# Patient Record
Sex: FEMALE | Race: WHITE | URBAN - METROPOLITAN AREA
[De-identification: names, ages, dates, MRNs, and addresses within clinical notes are randomized per-mention and may not be internally consistent; named-entity substitution may affect disease eponyms.]

---

## 2019-04-28 ENCOUNTER — HOSPITAL ENCOUNTER (OUTPATIENT)
Age: 34
Setting detail: OBSERVATION
Discharge: ANOTHER HOSPITAL | End: 2019-04-28
Attending: OBSTETRICS & GYNECOLOGY | Admitting: OBSTETRICS & GYNECOLOGY
Payer: COMMERCIAL

## 2019-04-28 VITALS
DIASTOLIC BLOOD PRESSURE: 68 MMHG | TEMPERATURE: 98.2 F | HEART RATE: 94 BPM | RESPIRATION RATE: 16 BRPM | HEIGHT: 64 IN | BODY MASS INDEX: 26.98 KG/M2 | WEIGHT: 158 LBS | SYSTOLIC BLOOD PRESSURE: 120 MMHG

## 2019-04-28 PROBLEM — O44.03 COMPLETE PLACENTA PREVIA NOS OR WITHOUT HEMORRHAGE, THIRD TRIMESTER: Status: ACTIVE | Noted: 2019-04-28

## 2019-04-28 PROBLEM — O36.8390 VARIABLE FETAL HEART RATE DECELERATIONS, ANTEPARTUM: Status: ACTIVE | Noted: 2019-04-28

## 2019-04-28 PROBLEM — Z3A.34 PREGNANCY WITH 34 COMPLETED WEEKS GESTATION: Status: ACTIVE | Noted: 2019-04-28

## 2019-04-28 PROBLEM — O44.13 COMPLETE PLACENTA PREVIA WITH HEMORRHAGE, THIRD TRIMESTER: Status: ACTIVE | Noted: 2019-04-28

## 2019-04-28 LAB
ABO/RH: NORMAL
ANTIBODY SCREEN: NORMAL
APTT: 27.3 SEC (ref 24.5–35.1)
D DIMER: 207 NG/ML DDU
HCT VFR BLD CALC: 37.9 % (ref 34–48)
HEMOGLOBIN: 12.6 G/DL (ref 11.5–15.5)
INR BLD: 1.1
MCH RBC QN AUTO: 30.8 PG (ref 26–35)
MCHC RBC AUTO-ENTMCNC: 33.2 % (ref 32–34.5)
MCV RBC AUTO: 92.7 FL (ref 80–99.9)
PDW BLD-RTO: 14 FL (ref 11.5–15)
PLATELET # BLD: 200 E9/L (ref 130–450)
PMV BLD AUTO: 11 FL (ref 7–12)
PROTHROMBIN TIME: 13 SEC (ref 9.3–12.4)
RBC # BLD: 4.09 E12/L (ref 3.5–5.5)
WBC # BLD: 10.3 E9/L (ref 4.5–11.5)

## 2019-04-28 PROCEDURE — 6360000002 HC RX W HCPCS: Performed by: OBSTETRICS & GYNECOLOGY

## 2019-04-28 PROCEDURE — G0378 HOSPITAL OBSERVATION PER HR: HCPCS

## 2019-04-28 PROCEDURE — 86900 BLOOD TYPING SEROLOGIC ABO: CPT

## 2019-04-28 PROCEDURE — 96372 THER/PROPH/DIAG INJ SC/IM: CPT

## 2019-04-28 PROCEDURE — 76805 OB US >/= 14 WKS SNGL FETUS: CPT

## 2019-04-28 PROCEDURE — 76819 FETAL BIOPHYS PROFIL W/O NST: CPT

## 2019-04-28 PROCEDURE — 76820 UMBILICAL ARTERY ECHO: CPT | Performed by: OBSTETRICS & GYNECOLOGY

## 2019-04-28 PROCEDURE — 36415 COLL VENOUS BLD VENIPUNCTURE: CPT

## 2019-04-28 PROCEDURE — 85610 PROTHROMBIN TIME: CPT

## 2019-04-28 PROCEDURE — 76821 MIDDLE CEREBRAL ARTERY ECHO: CPT | Performed by: OBSTETRICS & GYNECOLOGY

## 2019-04-28 PROCEDURE — 86850 RBC ANTIBODY SCREEN: CPT

## 2019-04-28 PROCEDURE — 85027 COMPLETE CBC AUTOMATED: CPT

## 2019-04-28 PROCEDURE — 76820 UMBILICAL ARTERY ECHO: CPT

## 2019-04-28 PROCEDURE — 85378 FIBRIN DEGRADE SEMIQUANT: CPT

## 2019-04-28 PROCEDURE — 99243 OFF/OP CNSLTJ NEW/EST LOW 30: CPT | Performed by: OBSTETRICS & GYNECOLOGY

## 2019-04-28 PROCEDURE — 99214 OFFICE O/P EST MOD 30 MIN: CPT | Performed by: OBSTETRICS & GYNECOLOGY

## 2019-04-28 PROCEDURE — 86901 BLOOD TYPING SEROLOGIC RH(D): CPT

## 2019-04-28 PROCEDURE — 76819 FETAL BIOPHYS PROFIL W/O NST: CPT | Performed by: OBSTETRICS & GYNECOLOGY

## 2019-04-28 PROCEDURE — G0379 DIRECT REFER HOSPITAL OBSERV: HCPCS

## 2019-04-28 PROCEDURE — 76821 MIDDLE CEREBRAL ARTERY ECHO: CPT

## 2019-04-28 PROCEDURE — 76805 OB US >/= 14 WKS SNGL FETUS: CPT | Performed by: OBSTETRICS & GYNECOLOGY

## 2019-04-28 PROCEDURE — 85730 THROMBOPLASTIN TIME PARTIAL: CPT

## 2019-04-28 RX ORDER — BETAMETHASONE SODIUM PHOSPHATE AND BETAMETHASONE ACETATE 3; 3 MG/ML; MG/ML
12 INJECTION, SUSPENSION INTRA-ARTICULAR; INTRALESIONAL; INTRAMUSCULAR; SOFT TISSUE ONCE
Status: COMPLETED | OUTPATIENT
Start: 2019-04-28 | End: 2019-04-28

## 2019-04-28 RX ORDER — BETAMETHASONE SODIUM PHOSPHATE AND BETAMETHASONE ACETATE 3; 3 MG/ML; MG/ML
12 INJECTION, SUSPENSION INTRA-ARTICULAR; INTRALESIONAL; INTRAMUSCULAR; SOFT TISSUE DAILY
Status: DISCONTINUED | OUTPATIENT
Start: 2019-04-28 | End: 2019-04-28 | Stop reason: HOSPADM

## 2019-04-28 RX ADMIN — BETAMETHASONE SODIUM PHOSPHATE AND BETAMETHASONE ACETATE 12 MG: 3; 3 INJECTION, SUSPENSION INTRA-ARTICULAR; INTRALESIONAL; INTRAMUSCULAR at 08:16

## 2019-04-28 NOTE — PROGRESS NOTES
Report given to Diandra Reyes, [facility] LAURY HOOVER triage nurse @ [facility].

## 2019-04-28 NOTE — CONSULTS
RE: SELENE SCHAEFFER  : 1985   AGE: 28 y.o. This report has been created using voice recognition software. It may contain errors which are inherent in voice recognition technology. Dear Dr. Deana Barahona:    I saw your patient Alexandru Baker today for the following indications:    Patient Active Problem List   Diagnosis    Complete placenta previa with hemorrhage, third trimester    34 weeks gestation of pregnancy    History of heavy vaginal bleeding    Variable fetal heart rate decelerations, antepartum     As you know, your patient is a 28 y.o. female, who is G1(0). She has an Estimated Date of Delivery: 19 based on her LMP and previous ultrasound assessment. She is currently 34 weeks 1 days gestation based on that assessment. The patient was admitted to the labor and delivery unit for evaluation and management secondary to vaginal bleeding from a placenta previa. She has prenatal care at [facility] in [place], [place]. This is her first bleeding episode. She has not received Celestone until this admission. She had no history of coagulopathy. She takes no anticoagulative medications. She had no history of abdominal trauma. The fetal heart rate tracing shows moderate variability with accelerations. An occasional variable deceleration was noted. She was having irregular uterine contractions. A fetal ultrasound evaluation was performed. A living leggett intrauterine fetus was identified in the cephalic presentation, with normal fetal heart motion and normal fetal motion noted. The inferior margin of the posterior placenta was covering the internal cervical os. The estimated fetal weight was at the [address] growth percentile. The amniotic fluid index was slightly increased at 25.7 cm. No apparent gross fetal anatomic abnormalities were identified near is visualized. The biophysical profile was scored 10 of 10. The cord Doppler SD ratios were within normal limits. Was no evidence of absence or reversal of end-diastolic flow. The MCA Doppler peak systolic velocity (PSV) was 58.48cm/sec. The median PSV at her current gestational age is 48.47 cm/sec. The observed value is 1.19 MoM of the median value. This is unlikely to be associated with fetal anemia. The MCA SD ratio was greater than 6 which indicates no evidence of centralization of fetal blood flow. OB History    Para Term  AB Living   1 0 0 0 0 0   SAB TAB Ectopic Molar Multiple Live Births   0 0 0   0        # Outcome Date GA Lbr Steve/2nd Weight Sex Delivery Anes PTL Lv   1 Current              PAST GYNECOLOGICAL  HISTORY:  Positive for abnormal pap smears. HPV  Positive for sexually transmitted diseases. HPV  Positive for cervical LEEP   Negative for uterine surgery. Negative for ovarian or tubal surgery. Past Medical History:   Diagnosis Date    Complete placenta previa nos or without hemorrhage, first trimester        Past Surgical History:   Procedure Laterality Date    WISDOM TOOTH EXTRACTION       No Known Allergies      Current Facility-Administered Medications:     betamethasone acetate-betamethasone sodium phosphate (CELESTONE) injection 12 mg, 12 mg, Intramuscular, Daily, Mari Humphries MD    Social History     Tobacco Use    Smoking status: Never Smoker    Smokeless tobacco: Never Used   Substance Use Topics    Alcohol use: Yes     Comment: occas     FAMILY MEDICAL HISTORY:   Negative for congenital abnormalities, autism, genetic disease and mental retardation, not listed above.      Review of Systems :   CONSTITUTIONAL : No fever, no chills   HEENT : No headache, no visual changes, no rhinorrhea, no sore throat   CARDIOVASCULAR : No pain, no palpitations, no edema   RESPIRATORY : No pain, no shortness of breath   GASTROINTESTINAL : No N/V, no D/C, no abdominal pain   GENITOURINARY : Vaginal bleeding from placenta previa  MUSCULOSKELETAL : No myalgia, No back pain  NEUROLOGICAL : No numbness, no tingling, no tremors. No history of seizures  ALL OTHER SYSTEMS WERE REPORTED AS NEGATIVE. PERTINENT PHYSICAL EXAMINATION:   Patient Vitals for the past 24 hrs:   BP Temp Temp src Pulse Resp Height Weight   04/28/19 0745 123/77 98.2 °F (36.8 °C) Oral 92 18 5' 4\" (1.626 m) 158 lb (71.7 kg)     GENERAL:   The patient is a well developed, female who is alert cooperative and oriented times three in no acute distress. HEENT:  Normo cephalic and atraumatic. No facial edema. ABDOMEN:   Her uterus is gravid. The uterus was soft and nontender. She had no complaint of abdominal pain or tenderness. The fetal heart rate was 144 bpm. The fetus was in the cephalic presentation which was confirmed by the ultrasound assessment. EXTREMITIES:  No peripheral edema is noted. PELVIC EXAMINATION:  Not repeated. The examination by Dr. Meka Martinez done at the time of her admission demonstrated a small amount of dark blood in the vaginal vault. A fetal ultrasound assessment was performed today. A report is enclosed for your review. Admission on 04/28/2019   Component Date Value Ref Range Status    WBC 04/28/2019 10.3  4.5 - 11.5 E9/L Final    RBC 04/28/2019 4.09  3.50 - 5.50 E12/L Final    Hemoglobin 04/28/2019 12.6  11.5 - 15.5 g/dL Final    Hematocrit 04/28/2019 37.9  34.0 - 48.0 % Final    MCV 04/28/2019 92.7  80.0 - 99.9 fL Final    MCH 04/28/2019 30.8  26.0 - 35.0 pg Final    MCHC 04/28/2019 33.2  32.0 - 34.5 % Final    RDW 04/28/2019 14.0  11.5 - 15.0 fL Final    Platelets 21/14/7560 200  130 - 450 E9/L Final    MPV 04/28/2019 11.0  7.0 - 12.0 fL Final     IMPRESSION:  1.  IUP at 34 weeks 1 days gestation based on her Estimated Date of Delivery: 6/8/19  2. Complete previa   3. Vaginal bleeding  4. Uterine contractions   5.   Reassuring BPP and cord Doppler testing    PLAN:  Laboratory testing was obtained including a CMP, CBC, fibrinogen, aPTT, PTT/INR, d-dimer, and urine drug screen. Continuous fetal heart rate and uterine contraction monitoring should be utilized for now. Celestone is being administered for acceleration of fetal organ maturity. DVT prophylaxis should be utilized throughout her admission. The patient is requesting transferred to [facility].  If she is clinically stable and is not having active vaginal bleeding, she may be transferred at her request.  The transfer will need to be accepted by the on-call physician [facility].    Further evaluation and management will be dependent on results the patient's testing and her clinical presentation. If you have any questions regarding her management, please contact me at your convenience and thank you for allowing me to participate in her care.     Sincerely,      Bang Figueroa MD, MS, Tim Guidry, GERMAINE, RDMS, RVT  Director [address] [place] [address]  [phone]

## 2019-04-28 NOTE — PROGRESS NOTES
[facility] ambulance called and stated they went to [address] and will be here at [place] [address] in approximately 20 minutes.

## 2019-04-28 NOTE — H&P
Department of Obstetrics and Gynecology  Labor and Delivery  Triage Note      SUBJECTIVE:  Sammie Olivares is a 28 y.o. female, , No LMP recorded. Patient is pregnant., Estimated Date of Delivery: 19, 34w1d, here with the complaint of vaginal bleeding the patient reports that she is known placenta previa she is having prenatal care in [place] where they plan to deliver her  by  on May 16 she reports that the bleeding was significant , she denies any abdominal pain except for light abdominal crampin,  has not had any previous bleeding episode     Prenatal course: Known placenta previa        Review of Systems:   Ears, nose, mouth, throat, and face: negative  Respiratory: negative  Cardiovascular: negative  Gastrointestinal: negative  Genitourinary:Vaginal bleeding  Integument/breast: negative  Hematologic/lymphatic: negative  Musculoskeletal:negative  Neurological: negative  Behavioral/Psych: negative  Endocrine: negative  Allergic/Immunologic: negative    OBJECTIVE    Vitals:  /77   Pulse 92   Temp 98.2 °F (36.8 °C) (Oral)   Resp 18   Ht 5' 4\" (1.626 m)   Wt 158 lb (71.7 kg)   BMI 27.12 kg/m²         Abdomen: Gravid, soft, nontender    SSE; small amount of dark blood. No active bleeding      Vaginal Exam: Deferred,     Fetal heart rate:         Baseline Heart Rate:  140        Accelerations:  present       Decelerations:  absent       Variability:  moderate    Contraction frequency: Occasional         ASSESSMENT:      34 weeks 1 day with vaginal bleeding. The patient to patient  is  out of town no active bleeding noted at this time. Fetal heart rate is reassuring.     Plan: Admit, CBC, type and screen steroid injection, MFM consultation

## 2019-04-28 NOTE — PROGRESS NOTES
Patient wishes to be transferred to [facility]. Ok to transfer as per Dr Ketty Oquendo in process.  Report given to Dr. Goldie Baumgarten  from [facility]

## 2019-04-28 NOTE — PROGRESS NOTES
Access center notified this RN that [address] will be here to transport patient at approximately [phone] today. Transfer paper signed and all papers faxed and copied as instructed. Pt updated on plan of care,all questions answered at this time.
